# Patient Record
Sex: FEMALE | Race: WHITE | NOT HISPANIC OR LATINO
[De-identification: names, ages, dates, MRNs, and addresses within clinical notes are randomized per-mention and may not be internally consistent; named-entity substitution may affect disease eponyms.]

---

## 2018-05-06 ENCOUNTER — RESULT REVIEW (OUTPATIENT)
Age: 38
End: 2018-05-06

## 2018-05-07 ENCOUNTER — APPOINTMENT (OUTPATIENT)
Dept: OBGYN | Facility: CLINIC | Age: 38
End: 2018-05-07
Payer: COMMERCIAL

## 2018-05-07 PROCEDURE — 99395 PREV VISIT EST AGE 18-39: CPT

## 2018-11-28 ENCOUNTER — APPOINTMENT (OUTPATIENT)
Dept: OBGYN | Facility: CLINIC | Age: 38
End: 2018-11-28

## 2019-05-28 ENCOUNTER — RESULT REVIEW (OUTPATIENT)
Age: 39
End: 2019-05-28

## 2019-05-29 ENCOUNTER — APPOINTMENT (OUTPATIENT)
Dept: OBGYN | Facility: CLINIC | Age: 39
End: 2019-05-29
Payer: COMMERCIAL

## 2019-05-29 PROCEDURE — 99395 PREV VISIT EST AGE 18-39: CPT

## 2019-07-14 ENCOUNTER — FORM ENCOUNTER (OUTPATIENT)
Age: 39
End: 2019-07-14

## 2019-07-15 ENCOUNTER — APPOINTMENT (OUTPATIENT)
Dept: OBGYN | Facility: CLINIC | Age: 39
End: 2019-07-15
Payer: COMMERCIAL

## 2019-07-15 PROCEDURE — 58300 INSERT INTRAUTERINE DEVICE: CPT

## 2019-07-15 PROCEDURE — 76830 TRANSVAGINAL US NON-OB: CPT

## 2019-08-12 ENCOUNTER — APPOINTMENT (OUTPATIENT)
Dept: OBGYN | Facility: CLINIC | Age: 39
End: 2019-08-12
Payer: COMMERCIAL

## 2019-08-12 PROCEDURE — 99213 OFFICE O/P EST LOW 20 MIN: CPT

## 2020-03-26 ENCOUNTER — APPOINTMENT (OUTPATIENT)
Dept: OBGYN | Facility: CLINIC | Age: 40
End: 2020-03-26

## 2021-05-25 ENCOUNTER — FORM ENCOUNTER (OUTPATIENT)
Age: 41
End: 2021-05-25

## 2021-05-25 ENCOUNTER — RESULT REVIEW (OUTPATIENT)
Age: 41
End: 2021-05-25

## 2021-05-26 ENCOUNTER — APPOINTMENT (OUTPATIENT)
Dept: OBGYN | Facility: CLINIC | Age: 41
End: 2021-05-26
Payer: COMMERCIAL

## 2021-05-26 ENCOUNTER — FORM ENCOUNTER (OUTPATIENT)
Age: 41
End: 2021-05-26

## 2021-05-26 PROCEDURE — 99072 ADDL SUPL MATRL&STAF TM PHE: CPT

## 2021-05-26 PROCEDURE — 99396 PREV VISIT EST AGE 40-64: CPT

## 2021-05-27 ENCOUNTER — APPOINTMENT (OUTPATIENT)
Dept: OBGYN | Facility: CLINIC | Age: 41
End: 2021-05-27

## 2021-06-03 ENCOUNTER — FORM ENCOUNTER (OUTPATIENT)
Age: 41
End: 2021-06-03

## 2021-07-10 ENCOUNTER — OUTPATIENT (OUTPATIENT)
Dept: OUTPATIENT SERVICES | Facility: HOSPITAL | Age: 41
LOS: 1 days | End: 2021-07-10

## 2021-07-10 ENCOUNTER — EMERGENCY (EMERGENCY)
Facility: HOSPITAL | Age: 41
LOS: 1 days | End: 2021-07-10
Payer: COMMERCIAL

## 2021-07-10 PROCEDURE — 99285 EMERGENCY DEPT VISIT HI MDM: CPT

## 2021-07-10 PROCEDURE — 93010 ELECTROCARDIOGRAM REPORT: CPT

## 2021-07-10 PROCEDURE — 70450 CT HEAD/BRAIN W/O DYE: CPT | Mod: 26

## 2021-07-10 PROCEDURE — 71045 X-RAY EXAM CHEST 1 VIEW: CPT | Mod: 26

## 2021-07-11 ENCOUNTER — OUTPATIENT (OUTPATIENT)
Dept: OUTPATIENT SERVICES | Facility: HOSPITAL | Age: 41
LOS: 1 days | End: 2021-07-11

## 2021-07-23 ENCOUNTER — TRANSCRIPTION ENCOUNTER (OUTPATIENT)
Age: 41
End: 2021-07-23

## 2021-09-01 ENCOUNTER — FORM ENCOUNTER (OUTPATIENT)
Age: 41
End: 2021-09-01

## 2021-09-04 ENCOUNTER — TRANSCRIPTION ENCOUNTER (OUTPATIENT)
Age: 41
End: 2021-09-04

## 2021-09-16 ENCOUNTER — FORM ENCOUNTER (OUTPATIENT)
Age: 41
End: 2021-09-16

## 2021-10-12 ENCOUNTER — APPOINTMENT (OUTPATIENT)
Dept: OBGYN | Facility: CLINIC | Age: 41
End: 2021-10-12

## 2022-07-25 ENCOUNTER — NON-APPOINTMENT (OUTPATIENT)
Age: 42
End: 2022-07-25

## 2022-07-25 ENCOUNTER — APPOINTMENT (OUTPATIENT)
Dept: OPHTHALMOLOGY | Facility: CLINIC | Age: 42
End: 2022-07-25

## 2022-07-25 PROCEDURE — 92014 COMPRE OPH EXAM EST PT 1/>: CPT

## 2022-07-25 PROCEDURE — 92015 DETERMINE REFRACTIVE STATE: CPT

## 2022-08-18 ENCOUNTER — APPOINTMENT (OUTPATIENT)
Dept: OBGYN | Facility: CLINIC | Age: 42
End: 2022-08-18

## 2022-08-18 VITALS
HEIGHT: 65 IN | BODY MASS INDEX: 27.49 KG/M2 | WEIGHT: 165 LBS | SYSTOLIC BLOOD PRESSURE: 107 MMHG | DIASTOLIC BLOOD PRESSURE: 82 MMHG

## 2022-08-18 DIAGNOSIS — Z80.3 FAMILY HISTORY OF MALIGNANT NEOPLASM OF BREAST: ICD-10-CM

## 2022-08-18 DIAGNOSIS — R92.2 INCONCLUSIVE MAMMOGRAM: ICD-10-CM

## 2022-08-18 DIAGNOSIS — Z12.31 ENCOUNTER FOR SCREENING MAMMOGRAM FOR MALIGNANT NEOPLASM OF BREAST: ICD-10-CM

## 2022-08-18 PROCEDURE — 99396 PREV VISIT EST AGE 40-64: CPT

## 2022-08-18 NOTE — PLAN
[FreeTextEntry1] : HCM\par -SBE\par -pap & HPV today\par -Rx mammo/sono given\par -MVI, Calcium, Vit d\par -Weight/exercise\par -scheduled for colonoscopy next month\par -desires TBL - to make consult with [facility]\par RTO 1 year\par

## 2022-08-18 NOTE — HISTORY OF PRESENT ILLNESS
[TextBox_4] : 43yo  presents for routine gyn exam. No complaints.  Normal cycle. Rhythm/condoms for contraception.  Interested in tbl.\par No current meds.\par \par Info. from prior EMR:\par Demographics: Race: White Ethnicity: Not  or  Native Language: english Occupation: Teacher\par : 3 Para: 2 0 1 2\par OB History: ectopic--mtx\par  primary c/s female 6-14 no  labor ?indication\par repeat section \par \par GYN\par previously used mirena iud 1wk only Sexually Active \par \par Type of Contraception: None\par PMH\par No significant past medical history.\par Accepts blood products\par Surgical History: c/s\par Allergies: nsaids\par Current Medications: Prescribed/Suppliments/OTC NONE\par Medications Verified Medications Verified\par Last PAP: 2019 -- pap/hpv neg Last Mammo: 2018 - PT AWARE MAMMOBIRAD 0 AND RECOMMENDS TARGETED LEFT BREAST SONO AND RX FAXED (ND 18) Last Breast Sono:: - targeted L breast sono benign - mammo/sono @ age 40 Last OB Sono: 2013 - 35 wk sono\par Family Hx\par Heart Disease: dad, mom\par \par Social History\par Patient nonsmoker and has no familial exposure to second hand smoke\par Smoker Status: Never smoker\par  [Mammogramdate] : 9/2021 [TextBox_19] : wnl [BreastSonogramDate] : 9/2021 [TextBox_25] : wnl [PapSmeardate] : 5/2021 [Regular Cycle Intervals] : periods have been regular [Currently Active] : currently active [No] : No [Patient refuses STI testing] : Patient refuses STI testing [FreeTextEntry2] :

## 2022-08-18 NOTE — PHYSICAL EXAM
[Chaperone Declined] : Patient declined chaperone [Appropriately responsive] : appropriately responsive [Alert] : alert [No Acute Distress] : no acute distress [No Lymphadenopathy] : no lymphadenopathy [Soft] : soft [Non-tender] : non-tender [Non-distended] : non-distended [No Lesions] : no lesions [No Mass] : no mass [Oriented x3] : oriented x3 [FreeTextEntry4] : Color pink, no distress, [FreeTextEntry5] : Resp. rate wnl, color pink, no SOB [Examination Of The Breasts] : a normal appearance [No Masses] : no breast masses were palpable [Labia Majora] : normal [Labia Minora] : normal [Normal] : normal [Uterine Adnexae] : normal

## 2022-08-25 LAB
CYTOLOGY CVX/VAG DOC THIN PREP: NORMAL
HPV HIGH+LOW RISK DNA PNL CVX: NOT DETECTED

## 2022-11-14 ENCOUNTER — RESULT REVIEW (OUTPATIENT)
Age: 42
End: 2022-11-14

## 2022-12-01 ENCOUNTER — RESULT REVIEW (OUTPATIENT)
Age: 42
End: 2022-12-01

## 2022-12-01 DIAGNOSIS — N60.09 SOLITARY CYST OF UNSPECIFIED BREAST: ICD-10-CM

## 2022-12-08 ENCOUNTER — RESULT REVIEW (OUTPATIENT)
Age: 42
End: 2022-12-08

## 2022-12-22 ENCOUNTER — TRANSCRIPTION ENCOUNTER (OUTPATIENT)
Age: 42
End: 2022-12-22

## 2023-07-20 ENCOUNTER — NON-APPOINTMENT (OUTPATIENT)
Age: 43
End: 2023-07-20

## 2023-07-20 ENCOUNTER — APPOINTMENT (OUTPATIENT)
Dept: OPHTHALMOLOGY | Facility: CLINIC | Age: 43
End: 2023-07-20
Payer: COMMERCIAL

## 2023-07-20 PROCEDURE — 99213 OFFICE O/P EST LOW 20 MIN: CPT

## 2023-08-01 ENCOUNTER — NON-APPOINTMENT (OUTPATIENT)
Age: 43
End: 2023-08-01

## 2023-08-01 ENCOUNTER — APPOINTMENT (OUTPATIENT)
Dept: OPHTHALMOLOGY | Facility: CLINIC | Age: 43
End: 2023-08-01
Payer: COMMERCIAL

## 2023-08-01 PROCEDURE — 92012 INTRM OPH EXAM EST PATIENT: CPT

## 2023-11-14 ENCOUNTER — APPOINTMENT (OUTPATIENT)
Dept: OBGYN | Facility: CLINIC | Age: 43
End: 2023-11-14

## 2023-11-28 DIAGNOSIS — N63.0 UNSPECIFIED LUMP IN UNSPECIFIED BREAST: ICD-10-CM

## 2023-11-28 DIAGNOSIS — Z12.39 ENCOUNTER FOR OTHER SCREENING FOR MALIGNANT NEOPLASM OF BREAST: ICD-10-CM

## 2023-12-26 ENCOUNTER — APPOINTMENT (OUTPATIENT)
Dept: OBGYN | Facility: CLINIC | Age: 43
End: 2023-12-26
Payer: COMMERCIAL

## 2023-12-26 VITALS
BODY MASS INDEX: 27.49 KG/M2 | WEIGHT: 165 LBS | SYSTOLIC BLOOD PRESSURE: 89 MMHG | HEIGHT: 65 IN | DIASTOLIC BLOOD PRESSURE: 68 MMHG

## 2023-12-26 DIAGNOSIS — Z01.419 ENCOUNTER FOR GYNECOLOGICAL EXAMINATION (GENERAL) (ROUTINE) W/OUT ABNORMAL FINDINGS: ICD-10-CM

## 2023-12-26 PROCEDURE — 99396 PREV VISIT EST AGE 40-64: CPT

## 2023-12-26 NOTE — HISTORY OF PRESENT ILLNESS
[FreeTextEntry1] : 44 y/o p2 presents for routine GYN exam. Doing well no complaints. Reports regular monthly menses. Using withdrawal /timed intercourse for pregnancy prevention.   Right breast bx 2022, benign.   HX  x2, ectopic x1  Had Mirena iud in the past, last 1 week, did not have a positive experience with it.  [Patient reported PAP Smear was normal] : Patient reported PAP Smear was normal [Mammogramdate] : 12/22 [TextBox_19] : right breast bx [PapSmeardate] : 8/2022 [Patient refuses STI testing] : Patient refuses STI testing

## 2023-12-26 NOTE — COUNSELING
[Nutrition/ Exercise/ Weight Management] : nutrition, exercise, weight management [Vitamins/Supplements] : vitamins/supplements [Drugs/Alcohol] : drugs, alcohol [Breast Self Exam] : breast self exam [Contraception/ Emergency Contraception/ Safe Sexual Practices] : contraception, emergency contraception, safe sexual practices [Confidentiality] : confidentiality [STD (testing, results, tx)] : STD (testing, results, tx) [Lab Results] : lab results [Medication Management] : medication management

## 2023-12-26 NOTE — PLAN
Chief Complaint   Patient presents with    RECHECK     Return general cardiology for follow-up - please see 3/20 mychart message 6 mo fu on SOB afib       Initial BP (!) 154/89 (BP Location: Left arm, Patient Position: Chair, Cuff Size: Adult Large)   Pulse 65   Wt 112.9 kg (249 lb)   SpO2 100%   BMI 33.77 kg/m   Estimated body mass index is 33.77 kg/m  as calculated from the following:    Height as of 2/22/23: 1.829 m (6').    Weight as of this encounter: 112.9 kg (249 lb)..  BP completed using cuff size: large    MAYRA Cordero    
[FreeTextEntry1] : Routine GYN Exam -Discussed and reviewed importance of monthly BSE -Declines STI testing, importance safe sexual practices discussed -Pap/HPV test collected and sent at todays visit -RX mammo/sono given to pt with locations and instructions -pt interested in BRCA screening, referred to Dr. Mittal -f/u PCP for recommended HCM, vaccinations and CA screening  rto 1 yr or sooner as needed.

## 2023-12-27 LAB — HPV HIGH+LOW RISK DNA PNL CVX: NOT DETECTED

## 2024-01-01 LAB — CYTOLOGY CVX/VAG DOC THIN PREP: NORMAL

## 2024-01-08 DIAGNOSIS — R92.8 OTHER ABNORMAL AND INCONCLUSIVE FINDINGS ON DIAGNOSTIC IMAGING OF BREAST: ICD-10-CM

## 2024-01-08 DIAGNOSIS — N64.89 OTHER SPECIFIED DISORDERS OF BREAST: ICD-10-CM

## 2024-01-25 PROBLEM — R92.8 ABNORMAL FINDINGS ON DIAGNOSTIC IMAGING OF BREAST: Status: ACTIVE | Noted: 2022-11-27

## 2024-03-07 ENCOUNTER — APPOINTMENT (OUTPATIENT)
Dept: OBGYN | Facility: CLINIC | Age: 44
End: 2024-03-07

## 2024-08-06 ENCOUNTER — APPOINTMENT (OUTPATIENT)
Dept: OPHTHALMOLOGY | Facility: CLINIC | Age: 44
End: 2024-08-06

## 2024-08-06 ENCOUNTER — NON-APPOINTMENT (OUTPATIENT)
Age: 44
End: 2024-08-06

## 2024-08-06 PROCEDURE — 92014 COMPRE OPH EXAM EST PT 1/>: CPT

## 2024-08-06 PROCEDURE — 92015 DETERMINE REFRACTIVE STATE: CPT

## 2024-08-08 PROBLEM — E78.5 HYPERLIPIDEMIA: Status: ACTIVE | Noted: 2024-08-08

## 2024-08-08 PROBLEM — N39.0 RECURRENT UTI: Status: ACTIVE | Noted: 2024-08-08

## 2024-08-08 PROBLEM — K59.00 CONSTIPATION IN FEMALE: Status: RESOLVED | Noted: 2024-08-08 | Resolved: 2024-08-08

## 2024-08-08 PROBLEM — Z85.828 HISTORY OF MALIGNANT NEOPLASM OF SKIN: Status: RESOLVED | Noted: 2024-08-08 | Resolved: 2024-08-08

## 2024-08-08 PROBLEM — R39.15 URINARY URGENCY: Status: ACTIVE | Noted: 2024-08-08

## 2024-08-08 PROBLEM — R35.1 NOCTURIA: Status: ACTIVE | Noted: 2024-08-08

## 2024-08-08 PROBLEM — Z83.438 FAMILY HISTORY OF HYPERLIPIDEMIA: Status: ACTIVE | Noted: 2024-08-08

## 2024-08-08 PROBLEM — Z82.49 FAMILY HISTORY OF HYPERTENSION: Status: ACTIVE | Noted: 2024-08-08

## 2024-08-08 PROBLEM — R32 URINARY INCONTINENCE: Status: ACTIVE | Noted: 2024-08-08

## 2024-08-15 ENCOUNTER — APPOINTMENT (OUTPATIENT)
Dept: UROGYNECOLOGY | Facility: CLINIC | Age: 44
End: 2024-08-15

## 2024-08-15 VITALS
WEIGHT: 165 LBS | BODY MASS INDEX: 27.49 KG/M2 | HEIGHT: 65 IN | HEART RATE: 62 BPM | SYSTOLIC BLOOD PRESSURE: 121 MMHG | DIASTOLIC BLOOD PRESSURE: 82 MMHG

## 2024-08-15 DIAGNOSIS — Z83.438 FAMILY HISTORY OF OTHER DISORDER OF LIPOPROTEIN METABOLISM AND OTHER LIPIDEMIA: ICD-10-CM

## 2024-08-15 DIAGNOSIS — E78.5 HYPERLIPIDEMIA, UNSPECIFIED: ICD-10-CM

## 2024-08-15 DIAGNOSIS — R35.1 NOCTURIA: ICD-10-CM

## 2024-08-15 DIAGNOSIS — R39.15 URGENCY OF URINATION: ICD-10-CM

## 2024-08-15 DIAGNOSIS — R32 UNSPECIFIED URINARY INCONTINENCE: ICD-10-CM

## 2024-08-15 DIAGNOSIS — K59.00 CONSTIPATION, UNSPECIFIED: ICD-10-CM

## 2024-08-15 DIAGNOSIS — Z85.828 PERSONAL HISTORY OF OTHER MALIGNANT NEOPLASM OF SKIN: ICD-10-CM

## 2024-08-15 DIAGNOSIS — Z82.49 FAMILY HISTORY OF ISCHEMIC HEART DISEASE AND OTHER DISEASES OF THE CIRCULATORY SYSTEM: ICD-10-CM

## 2024-08-15 DIAGNOSIS — N39.0 URINARY TRACT INFECTION, SITE NOT SPECIFIED: ICD-10-CM

## 2024-08-15 LAB
BILIRUB UR QL STRIP: NEGATIVE
CLARITY UR: CLEAR
COLLECTION METHOD: NORMAL
GLUCOSE UR-MCNC: NEGATIVE
HCG UR QL: 0.2 EU/DL
HGB UR QL STRIP.AUTO: NEGATIVE
KETONES UR-MCNC: NEGATIVE
LEUKOCYTE ESTERASE UR QL STRIP: NEGATIVE
NITRITE UR QL STRIP: NEGATIVE
PH UR STRIP: 6.5
PROT UR STRIP-MCNC: NEGATIVE
SP GR UR STRIP: 1.01

## 2024-08-15 PROCEDURE — 99459 PELVIC EXAMINATION: CPT

## 2024-08-15 PROCEDURE — 51701 INSERT BLADDER CATHETER: CPT | Mod: 59

## 2024-08-15 PROCEDURE — 99204 OFFICE O/P NEW MOD 45 MIN: CPT | Mod: 25

## 2024-08-15 PROCEDURE — 81003 URINALYSIS AUTO W/O SCOPE: CPT | Mod: QW

## 2024-08-15 RX ORDER — NITROFURANTOIN MACROCRYSTALS 100 MG/1
100 CAPSULE ORAL
Qty: 20 | Refills: 1 | Status: ACTIVE | COMMUNITY
Start: 2024-08-15 | End: 1900-01-01

## 2024-08-15 NOTE — DISCUSSION/SUMMARY
[FreeTextEntry1] : DRE is a 44 year female who presents for OAB and UTIs. On exam, normal PVR, no POP.  We reviewed her symptoms and exam findings. We discussed management options for overactive bladder including observation, behavioral modifications and bladder training, physical therapy and medications including anticholinergics and beta 3 agonists. We discussed additional treatment options including sacral neuromodulation, PTNS and intra detrusor Botox. IUGA handout on overactive bladder and bladder training was given to her.  We discussed etiology and management of recurrent urinary tract infections. We discussed behavioral modifications including increased oral intake, cranberry tablets, d mannose, vitamin C. We discussed management of recurrent UTIs with antibiotic prophylaxis. We discussed that if she is symptomatic I prefer for catheterized specimen however if she cannot come to the office, discussed u/a and culture prior to antibiotic treatment.   [] Post-coital Macrobid ppx - rx sent risks/benefits discussed [] Cranberry tablets, D-mannose, Vitamin C [] Gemtesa 75mg - 3 weeks of samples provided  f/u 3 weeks  All questions answered.

## 2024-08-15 NOTE — HISTORY OF PRESENT ILLNESS
[Vaginal Wall Prolapse] : no [Rectal Prolapse] : no [Unable To Restrain Bowel Movement] : mild [Urinary Frequency] : no [Feelings Of Urinary Urgency] : no [x1] : nocturia once nightly [Urinary Tract Infection] : mild [Constipation Obstructed Defecation] : no [] : no [Pelvic Pain] : no [Vaginal Pain] : no [FreeTextEntry1] : DRE is a 44 year female who presents for urinary urgency/incontinence/UTIs. Seen by Uro in the past for chronic UTIs all of her life. Reports UTIs occur after intercourse. She self-medicates her infections, has Macrobid at home. Takes 1 dose after intercourse or a full round of abx depending on her symptoms. Reports some episodes of gross hematuria with UTIs, last episode was 6 months ago. She has had 1-2 UTIs in the last year. Never had cysto done. She had kidney/bladder sono in the past to look for stones. Denies UTI preventative medication in the past. Reports 5-6 years ago she was on OAB medication, unsure of the name but it improved her symptoms. C/o significant onset of urinary urgency. Occasional episodes of UUI. Denies HARINI. 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 States her urine stream goes in many directions. Denies bulge, incomplete emptying. Reports irregular BMs does not go every day, has appointment for colonoscopy and endoscopy. Denies constipation or loose stool.    Daytime frequency: No Nocturia: 1 episode per night Urinary urgency: Yes Leakage of urine with urgency: Yes Leakage of urine with coughing sneezing laughing: Yes Sensation of incomplete bladder emptying: No History of frequent urinary tract infections: Yes History of hematuria: No Previous treatment: None Vaginal symptoms: Denies pelvic pain, Denies bulge Bowel symptoms: Denies constipation

## 2024-08-15 NOTE — PHYSICAL EXAM
[Chaperone Present] : A chaperone was present in the examining room during all aspects of the physical examination [04929] : A chaperone was present during the pelvic exam. [No Acute Distress] : in no acute distress [Well developed] : well developed [Well Nourished] : ~L well nourished [Oriented x3] : oriented to person, place, and time [No Edema] : ~T edema was not present [Warm and Dry] : was warm and dry to touch [Labia Majora] : were normal [Labia Minora] : were normal [Post Void Residual ____ml] : post void residual was [unfilled] ml [3] : 3 [Aa ____] : Aa [unfilled] [Ba ____] : Ba [unfilled] [C ____] : C [unfilled] [GH ____] : GH [unfilled] [PB ____] : PB [unfilled] [TVL ____] : TVL  [unfilled] [Ap ____] : Ap [unfilled] [Bp ____] : Bp [unfilled] [D ____] : D [unfilled] [Normal] : normal [Soft] :  the cervix was soft [Uterine Adnexae] : were not tender and not enlarged [FreeTextEntry2] :  Sienna Ace [Cough] : no cough [Normal Appearance] : ~T the appearance of the neck was normal [Tenderness] : ~T no ~M abdominal tenderness observed [Distended] : not distended [Hernia] : no hernia observed [Scar] : no scars [Normal Gait] : gait was normal

## 2024-08-15 NOTE — ADDENDUM
[FreeTextEntry1] : This note was written by Sienna Ace, acting as the  for Dr. Ritter. This note accurately reflects the work and decisions made by Dr. Ritter.

## 2024-08-15 NOTE — PHYSICAL EXAM
[Chaperone Present] : A chaperone was present in the examining room during all aspects of the physical examination [22421] : A chaperone was present during the pelvic exam. [No Acute Distress] : in no acute distress [Well developed] : well developed [Well Nourished] : ~L well nourished [Oriented x3] : oriented to person, place, and time [No Edema] : ~T edema was not present [Warm and Dry] : was warm and dry to touch [Labia Majora] : were normal [Labia Minora] : were normal [Post Void Residual ____ml] : post void residual was [unfilled] ml [3] : 3 [Aa ____] : Aa [unfilled] [Ba ____] : Ba [unfilled] [C ____] : C [unfilled] [GH ____] : GH [unfilled] [PB ____] : PB [unfilled] [TVL ____] : TVL  [unfilled] [Ap ____] : Ap [unfilled] [Bp ____] : Bp [unfilled] [D ____] : D [unfilled] [Normal] : normal [Soft] :  the cervix was soft [Uterine Adnexae] : were not tender and not enlarged [FreeTextEntry2] :  Sienna Ace [Cough] : no cough [Normal Appearance] : ~T the appearance of the neck was normal [Tenderness] : ~T no ~M abdominal tenderness observed [Distended] : not distended [Hernia] : no hernia observed [Scar] : no scars [Normal Gait] : gait was normal

## 2024-08-15 NOTE — OB HISTORY
[ ___] : [unfilled]  section delivery(s) [Definite ___ (Date)] : the last menstrual period was [unfilled] [Last Pap Smear ___] : date of last pap smear was on [unfilled] [Sexually Active] : sexually active [Abnormal Pap Smear] : normal pap smear [Taking Estrogens] : is not taking estrogen replacement [FreeTextEntry1] : Largest baby 6lbs 4oz

## 2024-08-15 NOTE — PROCEDURE
[Straight Catheterization] : insertion of a straight catheter [Patient] : the patient [___ Fr Straight Tip] : a [unfilled] in [place] straight tip catheter [None] : there were no complications with the catheter insertion [Clear] : clear [No Complications] : no complications [Tolerated Well] : the patient tolerated the procedure well [Post procedure instructions and information given] : Post procedure instructions and information were given and reviewed with patient. [0] : 0 [Urgent Incontinence] : urgent incontinence

## 2024-08-15 NOTE — REASON FOR VISIT
[Initial Visit ___] : an initial visit for [unfilled] [Questionnaire Received] : Patient questionnaire received [Intake Form Reviewed] : Patient intake form with past medical history, surgical history, family history and social history reviewed today [Urinary Incontinence] : urinary incontinence [Nocturia] : nocturia [Urinary Urgency] : urinary urgency [Recurrent Urinary Infections] : recurrent urinary infections

## 2024-08-15 NOTE — PROCEDURE
[Straight Catheterization] : insertion of a straight catheter [Patient] : the patient [___ Fr Straight Tip] : a [unfilled] in [place] straight tip catheter [None] : there were no complications with the catheter insertion [Clear] : clear [No Complications] : no complications [Tolerated Well] : the patient tolerated the procedure well [Post procedure instructions and information given] : Post procedure instructions and information were given and reviewed with patient. [0] : 0 [Urgent Incontinence] : urgent incontinence

## 2024-09-30 ENCOUNTER — APPOINTMENT (OUTPATIENT)
Dept: UROGYNECOLOGY | Facility: CLINIC | Age: 44
End: 2024-09-30

## 2024-10-13 ENCOUNTER — NON-APPOINTMENT (OUTPATIENT)
Age: 44
End: 2024-10-13

## 2024-12-26 ENCOUNTER — APPOINTMENT (OUTPATIENT)
Dept: OBGYN | Facility: CLINIC | Age: 44
End: 2024-12-26

## 2025-04-02 ENCOUNTER — APPOINTMENT (OUTPATIENT)
Dept: ULTRASOUND IMAGING | Facility: CLINIC | Age: 45
End: 2025-04-02
Payer: COMMERCIAL

## 2025-04-02 PROCEDURE — 76536 US EXAM OF HEAD AND NECK: CPT

## 2025-04-03 ENCOUNTER — APPOINTMENT (OUTPATIENT)
Dept: ULTRASOUND IMAGING | Facility: CLINIC | Age: 45
End: 2025-04-03

## 2025-08-12 ENCOUNTER — APPOINTMENT (OUTPATIENT)
Dept: OPHTHALMOLOGY | Facility: CLINIC | Age: 45
End: 2025-08-12
Payer: COMMERCIAL

## 2025-08-12 ENCOUNTER — NON-APPOINTMENT (OUTPATIENT)
Age: 45
End: 2025-08-12

## 2025-08-12 PROCEDURE — 92014 COMPRE OPH EXAM EST PT 1/>: CPT
